# Patient Record
Sex: MALE | Race: WHITE | NOT HISPANIC OR LATINO | Employment: FULL TIME | URBAN - METROPOLITAN AREA
[De-identification: names, ages, dates, MRNs, and addresses within clinical notes are randomized per-mention and may not be internally consistent; named-entity substitution may affect disease eponyms.]

---

## 2024-08-20 ENCOUNTER — HOSPITAL ENCOUNTER (EMERGENCY)
Facility: HOSPITAL | Age: 34
Discharge: HOME | End: 2024-08-20
Attending: STUDENT IN AN ORGANIZED HEALTH CARE EDUCATION/TRAINING PROGRAM
Payer: COMMERCIAL

## 2024-08-20 VITALS
SYSTOLIC BLOOD PRESSURE: 134 MMHG | BODY MASS INDEX: 27.6 KG/M2 | TEMPERATURE: 98 F | HEIGHT: 68 IN | DIASTOLIC BLOOD PRESSURE: 81 MMHG | OXYGEN SATURATION: 99 % | RESPIRATION RATE: 18 BRPM | WEIGHT: 182.13 LBS | HEART RATE: 75 BPM

## 2024-08-20 DIAGNOSIS — S09.92XA NASAL TRAUMA, INITIAL ENCOUNTER: ICD-10-CM

## 2024-08-20 DIAGNOSIS — S02.2XXA CLOSED FRACTURE OF NASAL BONE, INITIAL ENCOUNTER: Primary | ICD-10-CM

## 2024-08-20 PROCEDURE — 99284 EMERGENCY DEPT VISIT MOD MDM: CPT | Mod: 25

## 2024-08-20 RX ORDER — HYDROCODONE BITARTRATE AND ACETAMINOPHEN 7.5; 325 MG/1; MG/1
1 TABLET ORAL EVERY 6 HOURS PRN
Qty: 16 TABLET | Refills: 0 | Status: SHIPPED | OUTPATIENT
Start: 2024-08-20

## 2024-08-21 NOTE — ED PROVIDER NOTES
Encounter Date: 8/20/2024       History     Chief Complaint   Patient presents with    Facial Injury     Pt was playing tennis when racquet came out of hand and hit pt in the nose. Bleeding well controlled in triage. Denies thinners -LOC     See MDM    The history is provided by the patient. No  was used.     Review of patient's allergies indicates:  No Known Allergies  History reviewed. No pertinent past medical history.  History reviewed. No pertinent surgical history.  No family history on file.  Social History     Tobacco Use    Smoking status: Never    Smokeless tobacco: Never   Substance Use Topics    Alcohol use: Not Currently    Drug use: Not Currently     Review of Systems   Constitutional:  Negative for fever.   HENT:  Positive for nosebleeds.    Respiratory:  Negative for cough and shortness of breath.    Cardiovascular:  Negative for chest pain.   Gastrointestinal:  Negative for abdominal pain.   Genitourinary:  Negative for difficulty urinating and dysuria.   Musculoskeletal:  Negative for gait problem.   Skin:  Negative for color change.   Neurological:  Negative for dizziness, speech difficulty and headaches.   Psychiatric/Behavioral:  Negative for hallucinations and suicidal ideas.    All other systems reviewed and are negative.      Physical Exam     Initial Vitals [08/20/24 2020]   BP Pulse Resp Temp SpO2   (!) 138/97 70 18 98.1 °F (36.7 °C) 99 %      MAP       --         Physical Exam    Nursing note and vitals reviewed.  Constitutional: He appears well-developed and well-nourished.   HENT:   Head: Normocephalic.   Tenderness to right cheek and nose with scant nose bleeding    Eyes: EOM are normal.   Neck: Neck supple.   Normal range of motion.  Cardiovascular:  Normal rate, regular rhythm, normal heart sounds and intact distal pulses.           Pulmonary/Chest: Breath sounds normal.   Abdominal: Abdomen is soft. Bowel sounds are normal.   Musculoskeletal:         General:  Normal range of motion.      Cervical back: Normal range of motion and neck supple.     Neurological: He is alert and oriented to person, place, and time. He has normal strength.   Skin: Skin is warm and dry. Capillary refill takes less than 2 seconds.   Psychiatric: He has a normal mood and affect. His behavior is normal. Judgment and thought content normal.         ED Course   Procedures  Labs Reviewed - No data to display       Imaging Results              CT Maxillofacial Without Contrast (Preliminary result)  Result time 08/20/24 22:21:09      Preliminary result by Carroll Shrestha Jr., MD (08/20/24 22:21:09)                   Narrative:    START OF REPORT:  Technique: Noncontrast maxillofacial CT was performed with axial as well as sagittal and coronal images being submitted for interpretation.    Comparison: None.    Clinical history: Facial injury.    Findings:  Facial soft tissues: No hematoma or laceration identified.  Orbital soft tissues: The orbital soft tissues appear unremarkable.  Bones:  Orbital bony structures: The bilateral orbital bony structures are intact with no orbital fracture identified.  Mandible: The mandible appears unremarkable with no fracture.  Maxilla: The maxilla appears unremarkable with no fracture.  Zygoma: The zygomatic arches are intact.  TMJ: The mandibular condyles appear normally placed with respect to the mandibular fossa.  Nasal Bones: Mildly depressed right nasal bone comminuted fracture is seen with associated mild soft tissue swelling on series 2 image 36 through 42. Leftward deviation of the nasal septum is seen.  Skull: No acute linear or depressed fracture is identified in the visualized skull.  Paranasal sinuses: The visualized paranasal sinuses appear clear with no significant mucoperiosteal thickening or air fluid levels identified.  Mastoid air cells: The visualized mastoid air cells appear clear.  Brain: The visualized intracranial structures appear  unremarkable.      Impression:  1. Mildly depressed right nasal bone comminuted fracture is seen with associated mild soft tissue swelling on series 2 image 36 through 42.  2. Details and findings as noted above.                                         X-Ray Nasal Bones (Final result)  Result time 08/20/24 21:00:11      Final result by Andrew Borjas MD (08/20/24 21:00:11)                   Impression:      Concern for nondisplaced fracture of the nasal bones.      Electronically signed by: Andrew Borjas MD  Date:    08/20/2024  Time:    21:00               Narrative:    EXAMINATION:  XR NASAL BONES    CLINICAL HISTORY:  Unspecified injury of nose, initial encounter    TECHNIQUE:  As above    COMPARISON:  None    FINDINGS:  Concern for nondisplaced fracture of the nasal bones with associated soft tissue swelling.  The remaining osseous structures of the face are intact.  The nasal septum is intact.                                       Medications - No data to display  Medical Decision Making  Historian:  Patient.  Patient is a 34 y.o. male that presents with facial injury that has been present today. Associated symptoms right cheek and nose pain. Surrounding information is patient was accidentally hit in the face with a tennis racket. Exacerbated by nothing. Relieved by nothing. Patient treatment prior to arrival none. Risk factors include none. Other history pertaining to this complaint none.   Assessment:  See physical exam.  DD:  Facial fracture, nasal fracture, facial contusion, nasal contusion  ED Course: History was obtained.  Physical was performed.  And has a closed nasal fracture.  We will give him ENT follow up. Medical or surgical consults:  None. Social determinants that affect healthcare:  None.       Amount and/or Complexity of Data Reviewed  Radiology: ordered.     Details: CT does show a nasal fracture    Risk  Prescription drug management.  Risk Details: Norco                                       Clinical Impression:  Final diagnoses:  [S09.92XA] Nasal trauma, initial encounter  [S02.2XXA] Closed fracture of nasal bone, initial encounter (Primary)          ED Disposition Condition    Discharge Stable          ED Prescriptions       Medication Sig Dispense Start Date End Date Auth. Provider    HYDROcodone-acetaminophen (NORCO) 7.5-325 mg per tablet Take 1 tablet by mouth every 6 (six) hours as needed for Pain. 16 tablet 8/20/2024 -- Nelson Allison FNP          Follow-up Information       Follow up With Specialties Details Why Contact Info    Silvestre Toledo Jr., MD Otolaryngology Call in 1 week ed follow up [address] [facility]  [address]  [facility] [address]  [phone]               Nelson Allison FNP  08/20/24 [id number]

## 2024-08-21 NOTE — FIRST PROVIDER EVALUATION
Medical screening examination initiated.  I have conducted a focused provider triage encounter, findings are as follows:    Brief history of present illness:  34yoM with nose pain and nosebleed after tennis racket flew out hand and hit him in the face. No having nosebleed. Controlled in triage. -BT. No vomiting or vision changes. No ibuprofen/tylenol taken.     There were no vitals filed for this visit.    Pertinent physical exam:  hemostasis in triage    Brief workup plan:  images and exam    Preliminary workup initiated; this workup will be continued and followed by the physician or advanced practice provider that is assigned to the patient when roomed.